# Patient Record
Sex: FEMALE | Race: WHITE | NOT HISPANIC OR LATINO | URBAN - METROPOLITAN AREA
[De-identification: names, ages, dates, MRNs, and addresses within clinical notes are randomized per-mention and may not be internally consistent; named-entity substitution may affect disease eponyms.]

---

## 2019-08-23 ENCOUNTER — EMERGENCY (EMERGENCY)
Facility: HOSPITAL | Age: 44
LOS: 1 days | Discharge: HOME | End: 2019-08-23
Attending: EMERGENCY MEDICINE
Payer: COMMERCIAL

## 2019-08-23 VITALS
TEMPERATURE: 98 F | RESPIRATION RATE: 18 BRPM | WEIGHT: 139.99 LBS | HEART RATE: 77 BPM | DIASTOLIC BLOOD PRESSURE: 80 MMHG | HEIGHT: 68 IN | SYSTOLIC BLOOD PRESSURE: 120 MMHG | OXYGEN SATURATION: 100 %

## 2019-08-23 PROCEDURE — 99283 EMERGENCY DEPT VISIT LOW MDM: CPT | Mod: 25

## 2019-08-23 PROCEDURE — 73660 X-RAY EXAM OF TOE(S): CPT | Mod: 26,LT

## 2019-08-23 PROCEDURE — 28490 TREAT BIG TOE FRACTURE: CPT | Mod: TA

## 2019-08-23 PROCEDURE — 28490 TREAT BIG TOE FRACTURE: CPT | Mod: 54

## 2019-08-23 PROCEDURE — 90471 IMMUNIZATION ADMIN: CPT

## 2019-08-23 PROCEDURE — 73660 X-RAY EXAM OF TOE(S): CPT

## 2019-08-23 PROCEDURE — 90715 TDAP VACCINE 7 YRS/> IM: CPT

## 2019-08-23 RX ORDER — TETANUS TOXOID, REDUCED DIPHTHERIA TOXOID AND ACELLULAR PERTUSSIS VACCINE, ADSORBED 5; 2.5; 8; 8; 2.5 [IU]/.5ML; [IU]/.5ML; UG/.5ML; UG/.5ML; UG/.5ML
0.5 SUSPENSION INTRAMUSCULAR ONCE
Refills: 0 | Status: COMPLETED | OUTPATIENT
Start: 2019-08-23 | End: 2019-08-23

## 2019-08-23 RX ORDER — IBUPROFEN 200 MG
600 TABLET ORAL ONCE
Refills: 0 | Status: COMPLETED | OUTPATIENT
Start: 2019-08-23 | End: 2019-08-23

## 2019-08-23 RX ADMIN — TETANUS TOXOID, REDUCED DIPHTHERIA TOXOID AND ACELLULAR PERTUSSIS VACCINE, ADSORBED 0.5 MILLILITER(S): 5; 2.5; 8; 8; 2.5 SUSPENSION INTRAMUSCULAR at 10:50

## 2019-08-23 RX ADMIN — Medication 600 MILLIGRAM(S): at 11:25

## 2019-08-23 RX ADMIN — Medication 600 MILLIGRAM(S): at 10:51

## 2019-08-23 NOTE — ED PROVIDER NOTE - NSFOLLOWUPINSTRUCTIONS_ED_ALL_ED_FT
FOLLOW-UP WITH THE [facility]  [phone].  TAKE MOTRIN (IBUPROFEN) AS NEEDED FOR PAIN, 600 MG EVERY 8 HOURS FOR THE NEXT 3 DAYS.  RETURN TO THE ED FOR ANY NEW OR WORSENING SYMPTOMS.    AMBULATORY CARE:    A toe fracture is a break in 1 or more of the bones in your toe. It is most commonly caused by a direct blow to the toe. The bones in your toe may just be broken, or they may be out of place or . Foot Anatomy         Common symptoms include the following:     Pain, redness, and swelling      Inability to bend or move your toe      Inability to walk or put weight on your toe      Toe is bent at an abnormal angle    Seek care immediately if:     Blood soaks through your bandage.      You have severe pain in your toe.      Your toe is cold or numb.    Contact your healthcare provider if:     You have a fever.      Your pain does not go away, even after treatment.      Your toe continues to hurt even after it has healed.      You have questions or concerns about your condition or care.    Treatment for a toe fracture may include any of the following:     NSAIDs, such as ibuprofen, help decrease swelling, pain, and fever. This medicine is available with or without a doctor's order. NSAIDs can cause stomach bleeding or kidney problems in certain people. If you take blood thinner medicine, always ask your healthcare provider if NSAIDs are safe for you. Always read the medicine label and follow directions.      Prescription pain medicine may be given. Ask your healthcare provider how to take this medicine safely. Some prescription pain medicines contain acetaminophen. Do not take other medicines that contain acetaminophen without talking to your healthcare provider. Too much acetaminophen may cause liver damage. Prescription pain medicine may cause constipation. Ask your healthcare provider how to prevent or treat constipation.       Antibiotics treat a bacterial infection. You may need antibiotics if you have an open wound.      Take your medicine as directed. Contact your healthcare provider if you think your medicine is not helping or if you have side effects. Tell him of her if you are allergic to any medicine. Keep a list of the medicines, vitamins, and herbs you take. Include the amounts, and when and why you take them. Bring the list or the pill bottles to follow-up visits. Carry your medicine list with you in case of an emergency.    Self-care:     Use buddy tape, an elastic bandage, or a splint as directed. These help keep your toe in its correct position as it heals. Buddy tape is when your fractured toe and the toe next to it are taped together.       Use support devices including a cane, crutches, walking boot, or hard soled shoe as directed. These help protect your broken toe and limit movement so it can heal.Walking Boot           Rest your toe so that it can heal. Return to normal activities as directed.      Apply ice on your toe for 15 to 20 minutes every hour or as directed. Use an ice pack, or put crushed ice in a plastic bag. Cover it with a towel. Ice helps prevent tissue damage and decreases swelling and pain.      Elevate your toe above the level of your heart as often as you can. This will help decrease swelling and pain. Prop your toe on pillows or blankets to keep it elevated comfortably.     Follow up with your healthcare provider as directed: You may need to return in 2 to 4 weeks. Write down your questions so you remember to ask them during your visits.

## 2019-08-23 NOTE — ED PROVIDER NOTE - CARE PLAN
Principal Discharge DX:	Closed nondisplaced fracture of distal phalanx of left great toe, initial encounter

## 2019-08-23 NOTE — ED PROVIDER NOTE - PHYSICAL EXAMINATION
GEN: NAD  HEENT: NCAT  MSK: L great toe mild edema/bruising, TTP, limited ROM; normal ROM at ankle, normal ROM @ knee.  ~1.5cm2 abrasion to the anterior L shin, much smaller abrasion to anterior R shin, no repair necessary GEN: NAD  HEENT: NCAT  MSK: L great toe mild edema/bruising, TTP over the phalyngeal joint, limited ROM of the hallux 2/2 pain; normal ROM at ankle, normal ROM @ knee.  no midfoot pain/tenderness  ~1.5cm2 abrasion to the anterior L shin, much smaller abrasion to anterior R shin, no repair necessary GEN: NAD  HEENT: NCAT  MSK: L great toe mild edema/bruising, TTP over the phalyngeal joint, limited ROM of the hallux 2/2 pain though normal ROM at the MTP joint; normal ROM at ankle, normal ROM @ knee.  no midfoot pain/tenderness/edema  ~1.5cm2 abrasion to the anterior L shin, much smaller abrasion to anterior R shin, no repair necessary

## 2019-08-23 NOTE — ED PROVIDER NOTE - OBJECTIVE STATEMENT
43 yo female mechanical slip/fall last PM after tripping on an umbrella stand.  did not hit head, no LOC.  c/o L great toe pain.  also with abrasions to L>R shin, put bactin on them this AM.

## 2019-08-23 NOTE — ED ADULT NURSE NOTE - NSIMPLEMENTINTERV_GEN_ALL_ED
Implemented All Universal Safety Interventions:  [place] to call system. Call bell, personal items and telephone within reach. Instruct patient to call for assistance. Room bathroom lighting operational. Non-slip footwear when patient is off stretcher. Physically safe environment: no spills, clutter or unnecessary equipment. Stretcher in lowest position, wheels locked, appropriate side rails in place.

## 2019-08-23 NOTE — ED PROVIDER NOTE - CLINICAL SUMMARY MEDICAL DECISION MAKING FREE TEXT BOX
tetanus, medicate, x-ray r/o fx tetanus, medicate, x-ray r/o fx big toe fx.  no ankle injury, no lisfranc injury.

## 2024-03-18 ENCOUNTER — EMERGENCY (EMERGENCY)
Facility: HOSPITAL | Age: 49
LOS: 1 days | Discharge: HOME | End: 2024-03-18
Attending: EMERGENCY MEDICINE
Payer: COMMERCIAL

## 2024-03-18 VITALS
HEART RATE: 66 BPM | HEIGHT: 68 IN | OXYGEN SATURATION: 99 % | DIASTOLIC BLOOD PRESSURE: 87 MMHG | WEIGHT: 156.97 LBS | TEMPERATURE: 98 F | RESPIRATION RATE: 16 BRPM | SYSTOLIC BLOOD PRESSURE: 126 MMHG

## 2024-03-18 VITALS
SYSTOLIC BLOOD PRESSURE: 120 MMHG | RESPIRATION RATE: 18 BRPM | OXYGEN SATURATION: 100 % | HEART RATE: 59 BPM | DIASTOLIC BLOOD PRESSURE: 73 MMHG | TEMPERATURE: 98 F

## 2024-03-18 LAB
FLUAV AG NPH QL: SIGNIFICANT CHANGE UP
FLUBV AG NPH QL: SIGNIFICANT CHANGE UP
RSV RNA NPH QL NAA+NON-PROBE: SIGNIFICANT CHANGE UP
SARS-COV-2 RNA SPEC QL NAA+PROBE: SIGNIFICANT CHANGE UP

## 2024-03-18 PROCEDURE — 99284 EMERGENCY DEPT VISIT MOD MDM: CPT

## 2024-03-18 PROCEDURE — 87637 SARSCOV2&INF A&B&RSV AMP PRB: CPT

## 2024-03-18 RX ORDER — FEXOFENADINE HCL 30 MG
1 TABLET ORAL
Qty: 30 | Refills: 0
Start: 2024-03-18 | End: 2024-04-16

## 2024-03-18 NOTE — CONSULT NOTE ADULT - ASSESSMENT
NOTE INCOMPLETE    ASSESSMENT/PLAN:   #     - Start  - Recommend     Thank you for this consult. Case seen and reviewed with attending dermatologist Dr. Kobl    Please page [phone] with a 10-digit call-back number for further related questions.    Clary Prescott MD  Resident Physician, PGY-2   [facility]   Pager: [phone] ASSESSMENT/PLAN:     # PLEVA (Pityriasis lichenoides et varioliformis acuta) vs. papular urticaria vs. viral exanthem  - Start clobetasol 0.05% ointment BID to affected/itchy or symptomatic areas on body for up to 2 weeks on, at least 1 week break before resuming as needed. Avoid face and groin.  - If mildly symptomatic, triamcinolone 0.01% ointment (less potent than clobetasol) BID PRN for up to 2 weeks on, 1 week off.   - Recommend daily Allegra 180mg/day  - Follow up RVP performed today 3/18  - Recommend outpatient follow up with Dr. Kolb within 1 week for re-evaluation and possible skin biopsy. Dermatology will coordinate. Appointment will be at the address below:  [address] [place] [address], [facility] [address]  [place] [address]  [phone]    Thank you for this consult. Case seen and reviewed with attending dermatologist Dr. Kolb    Please page [phone] with a 10-digit call-back number for further related questions.    Clary Prescott MD  Resident Physician, PGY-2   [facility]   Pager: [phone]

## 2024-03-18 NOTE — ED PROVIDER NOTE - PHYSICAL EXAMINATION
General: Well-appearing, NAD  Head: Atraumatic, normocephalic  Eyes: EOM grossly in tact, no scleral icterus  ENT: moist mucous membranes  Neurology: A&Ox 3 , nonfocal, GUPTA x 4  Respiratory: normal respiratory effort, no wheezing, CTAB  CV: Extremities warm and well perfused, RRR  Abdominal: Soft, non-distended  Extremities: No edema  Skin: blanching maculopapular rash on trunk, extremities, chest and back

## 2024-03-18 NOTE — ED PROVIDER NOTE - OBJECTIVE STATEMENT
49 yo F w/ no significant pmh presenting w/ a rash x 6d. She denies any recent changes in products in her home, sick contacts. She went to [place] 3 weeks ago but did not swim. No one in her home or work has a similar rash. No fever, dysuria, trouble urinating, cough, sore throat, trouble breathing. She does not have any lesions on her palms or soles. She saw a dermatologist who gave her triamcinolone, she does not think it has helped. The only new thing she has recently been exposed to was 1 dose of aspirin.

## 2024-03-18 NOTE — ED PROVIDER NOTE - NSFOLLOWUPCLINICS_GEN_ALL_ED_FT
[facility]  Dermatology  [address], [place] [address]  Phone: [phone]  Fax: [phone]    [facility]  Dermatology  [address], [place] [id number]  Phone: [phone]  Fax:

## 2024-03-18 NOTE — ED PROVIDER NOTE - NSFOLLOWUPINSTRUCTIONS_ED_ALL_ED_FT
-  Please follow up with your primary care physician or dermatologist within 2-3 days.     -  You may take 975 mg acetaminophen every 6 hours as needed for pain.    -  You were seen in the ER for a rash. Based on the rash we want to do a trial of oral steroids and antihistamines. We sent these medications to your pharmacy, take them as prescribed. If your rash does not improve, please see your dermatologist as you may require a biopsy.    -  Call your doctor or seek immediate medical care if:    You have signs of infection, such as:  Increased pain, swelling, warmth, or redness.  Red streaks leading from the area.  Pus draining from the area.  A fever.  You have joint pain along with the rash. -  Please follow up with your primary care physician or dermatologist within 1 week.    -  You may take 975 mg acetaminophen every 6 hours as needed for pain.    -  You were seen in the ER for a rash. Based on the rash dermatology recommends clobetasol ointment for itchy spots and triamcinolone to the rash 2 times a day. They recommended allegra daily. We sent these medications to your pharmacy, take them as prescribed. Please see your dermatologist as you may require a biopsy.    -  Call your doctor or seek immediate medical care if:    You have signs of infection, such as:  Increased pain, swelling, warmth, or redness.  Red streaks leading from the area.  Pus draining from the area.  A fever.  You have joint pain along with the rash.

## 2024-03-18 NOTE — ED PROVIDER NOTE - CLINICAL SUMMARY MEDICAL DECISION MAKING FREE TEXT BOX
49 yo F w/ no significant pmh presenting w/ a rash x 6d. Differential diagnosis includes but is not limited to urticaria, drug eruption, viral infection, contact dermatitis, allergy. Pt took ASA which could be the issue. would  not to take any more aspirin. would encourage dermatology f/u and allergy testing. no testing indicated in the ER. pt has already seen dermatology. would give po prednisone and zyrtec. if no improvement the pt may need a biopsy outpatient.

## 2024-03-18 NOTE — ED PROVIDER NOTE - ATTENDING CONTRIBUTION TO CARE
Attending MD Diaz: I personally have seen and examined this patient.  Resident note reviewed and agree on plan of care and except where noted.  See below for details.     Seen in [facility]    48F with no reported contributory PMH/PSH/Meds, no known drug allergies presents to the ED with rash for about a week.  Reports three weeks ago went to [place], denies swimming.  Reports used new body wash about 2 weeks ago and about a week later developed rash.  Reports went to see dermatology (showed name of Nancy Brumfield PA-C) and was given Rx for Triamcinolone.  Reports not improving.  Reports rash is not pruritic, denies drainage from rash.  Denies associated fever, chills, urinary symptoms, URI symptoms.  Denies nausea, vomiting, diarrhea, abdominal pain, chest pain, shortness of breath.  Denies oral lesions or noting rash on hands and palms.    Exam:   General: NAD  HENT: head NCAT, airway patent   Chest: symmetric chest rise, no increased work of breathing  MSK: ranging neck freely  Neuro: moving all extremities spontaneously, sensory grossly intact, no gross neuro deficits  Psych: normal mood and affect   Skin: scattered rash on chest > abdomen > back, worse at bilateral axillae and extending from axillae, bilateral proximal inner thighs, scattered elsewhere on LEs, +bilateral UEs dorsal/ventral, 2-4mm in size, round, nontender, nonvesicular    TO BE COMPLETED Attending MD Diaz: I personally have seen and examined this patient.  Resident note reviewed and agree on plan of care and except where noted.  See below for details.     Seen in [facility]    48F with no reported contributory PMH/PSH/Meds, no known drug allergies presents to the ED with rash for about a week.  Reports three weeks ago went to [place], denies swimming.  Reports used new body wash about 2 weeks ago and about a week later developed rash.  Reports went to see dermatology (showed name of Nancy Brumfield PA-C) and was given Rx for Triamcinolone.  Reports not improving.  Reports rash is not pruritic, denies drainage from rash.  Denies associated fever, chills, urinary symptoms, URI symptoms.  Denies nausea, vomiting, diarrhea, abdominal pain, chest pain, shortness of breath.  Denies oral lesions or noting rash on hands and palms.    Exam:   General: NAD  HENT: head NCAT, airway patent   Chest: symmetric chest rise, no increased work of breathing  MSK: ranging neck freely  Neuro: moving all extremities spontaneously, sensory grossly intact, no gross neuro deficits  Psych: normal mood and affect   Skin: scattered rash on chest > abdomen > back, worse at bilateral axillae and extending from axillae, bilateral proximal inner thighs, scattered elsewhere on LEs, +bilateral UEs dorsal/ventral, 2-4mm in size, round, nontender, nonvesicular    A/P: 48F with rash, worsening, DDx includes viral exanthem, contact dermatitis, ?insect bites (less likely given location and distribution), will consult derm

## 2024-03-18 NOTE — ED PROVIDER NOTE - PATIENT PORTAL LINK FT
You can access the FollowMyHealth Patient Portal offered by [facility] by registering at the following website: [email or web address]. By joining [facility]’s FollowMyHealth portal, you will also be able to view your health information using other applications (apps) compatible with our system.

## 2024-03-18 NOTE — ED ADULT NURSE NOTE - OBJECTIVE STATEMENT
49 yo F w/ no significant pmh presenting w/ a rash x 6d. She denies any recent changes in products in her home, sick contacts. She went to [place] 3 weeks ago but did not swim. No one in her home or work has a similar rash. No fever, dysuria, trouble urinating, cough, sore throat, trouble breathing. She does not have any lesions on her palms or soles. She saw a dermatologist who gave her triamcinolone, she does not think it has helped. Pt was seen by derm Pt has appointment on Tuesday for follow up no orders at this time Leonardo

## 2024-03-18 NOTE — ED PROVIDER NOTE - PROGRESS NOTE DETAILS
Jarvis, PGY-3, EM: Pt seen by dermatology. They recommended clobetasol ointment, triamcinolone ointment and allegra daily. pt will f/u in the office for a biopsy. requesting RVP but I am unable to order.

## 2024-03-18 NOTE — ED PROVIDER NOTE - ED STEMI HIDDEN
Retention Suture Text: Retention sutures were placed to support the closure and prevent dehiscence. hide

## 2024-03-19 NOTE — ED POST DISCHARGE NOTE - ADDITIONAL DOCUMENTATION
I was emailed by referral coordinator, pt requesting call regarding viral swab, I called and informed pt of results. -Evan Loomis PA-C

## 2024-03-26 ENCOUNTER — LABORATORY RESULT (OUTPATIENT)
Age: 49
End: 2024-03-26

## 2024-03-26 ENCOUNTER — APPOINTMENT (OUTPATIENT)
Dept: DERMATOLOGY | Facility: CLINIC | Age: 49
End: 2024-03-26
Payer: COMMERCIAL

## 2024-03-26 DIAGNOSIS — D48.5 NEOPLASM OF UNCERTAIN BEHAVIOR OF SKIN: ICD-10-CM

## 2024-03-26 PROCEDURE — 99204 OFFICE O/P NEW MOD 45 MIN: CPT | Mod: 25

## 2024-03-26 PROCEDURE — 11104 PUNCH BX SKIN SINGLE LESION: CPT

## 2024-03-26 PROCEDURE — 11105 PUNCH BX SKIN EA SEP/ADDL: CPT

## 2024-04-03 ENCOUNTER — APPOINTMENT (OUTPATIENT)
Dept: DERMATOLOGY | Facility: CLINIC | Age: 49
End: 2024-04-03
Payer: COMMERCIAL

## 2024-04-03 DIAGNOSIS — L30.9 DERMATITIS, UNSPECIFIED: ICD-10-CM

## 2024-04-03 PROCEDURE — 99214 OFFICE O/P EST MOD 30 MIN: CPT

## 2024-04-03 RX ORDER — PERMETHRIN 50 MG/G
5 CREAM TOPICAL
Qty: 8 | Refills: 0 | Status: ACTIVE | COMMUNITY
Start: 2024-04-03 | End: 1900-01-01

## 2024-04-03 NOTE — ASSESSMENT
[FreeTextEntry1] : #dermatitis, not improving w/ triamcinolone #neoplasm of uncertain behavior of skin  Prelim bx shows numerous eos, possiblle arthropoc, ?nodular scabies, ddx also includes  LyP vs b-cell lympoproliferative disorder vs papular urticaria v Tissue culture neg so far, final fungal and mycobacterial culture still pending START ivermectin 15mg today and repeat in 1 week. SED START clobetasol ointment BID to affected area only, no more than 2 weeks, avoid face and groin - r/b/a topical steroids were discussed including but not limited to thinning of the skin, atrophy and dyspigmentation. START permethrin neck down today, repeat in 1 week

## 2024-04-03 NOTE — HISTORY OF PRESENT ILLNESS
[de-identified] : CHER OROZCO is a 48 year old female with no significant PMH, who presents for f/u after being seen by dermatology in the [facility] ED for a rash x 2 weeks.  Biopsy from last week still pending, while some lesions are drying out she is getting some new ones that are painful, prelim bx report shows numerous eos  HISTORY Started as pink bumps on upper abdomen. Mildly itchy, no new products or contacts to that area, no recollection of bug bites or other trauma to this area. No one else at home with similar rash. The rash progressed to lower abdomen, arms, upper legs, and now also with spots on forehead/cheeks.  Saw outside derm who prescribed Triamcinolone cream - not helping much.  Traveled to [place] recently, 2/26-2/29 for business trip; no other recent travel. No recent illnesses. Reportedly had routine labs done with PCP recenlty with no abnormalities noted. No fever/chills, weight loss. Unable to obtain clobetasol ointment due to insurance not allowing additional rx within timeframe  From [place] originally, goes back every summer Works in housekeeping at [employer]  [FreeTextEntry1] : Hospital f/u

## 2024-04-03 NOTE — PHYSICAL EXAM
[FreeTextEntry3] : scattered red edematous papules of varying sizes on the arms (especiailly medial forearms, and inner thigh, face brown- pink macules and papuels, some which collarettes of scale on the trunk and arms

## 2024-04-04 RX ORDER — HALOBETASOL PROPIONATE 0.5 MG/G
0.05 OINTMENT TOPICAL
Qty: 1 | Refills: 3 | Status: ACTIVE | COMMUNITY
Start: 2024-04-04 | End: 1900-01-01

## 2024-04-05 RX ORDER — CLOBETASOL PROPIONATE 0.5 MG/G
0.05 OINTMENT TOPICAL
Qty: 1 | Refills: 2 | Status: ACTIVE | COMMUNITY
Start: 2024-04-03

## 2024-04-08 RX ORDER — IVERMECTIN 3 MG/1
3 TABLET ORAL
Qty: 10 | Refills: 0 | Status: ACTIVE | COMMUNITY
Start: 2024-04-03

## 2024-04-11 ENCOUNTER — NON-APPOINTMENT (OUTPATIENT)
Age: 49
End: 2024-04-11

## 2024-04-12 ENCOUNTER — NON-APPOINTMENT (OUTPATIENT)
Age: 49
End: 2024-04-12

## 2024-04-12 DIAGNOSIS — Z79.899 OTHER LONG TERM (CURRENT) DRUG THERAPY: ICD-10-CM

## 2024-04-16 ENCOUNTER — APPOINTMENT (OUTPATIENT)
Dept: DERMATOLOGY | Facility: CLINIC | Age: 49
End: 2024-04-16
Payer: COMMERCIAL

## 2024-04-16 DIAGNOSIS — C86.6 PRIMARY CUTANEOUS CD30-POSITIVE T-CELL PROLIFERATIONS: ICD-10-CM

## 2024-04-16 LAB
ALBUMIN SERPL ELPH-MCNC: 4.8 G/DL
ALP BLD-CCNC: 59 U/L
ALT SERPL-CCNC: 10 U/L
ANION GAP SERPL CALC-SCNC: 12 MMOL/L
AST SERPL-CCNC: 17 U/L
BASOPHILS # BLD AUTO: 0.04 K/UL
BASOPHILS NFR BLD AUTO: 0.9 %
BILIRUB SERPL-MCNC: 0.5 MG/DL
BUN SERPL-MCNC: 16 MG/DL
CALCIUM SERPL-MCNC: 9.5 MG/DL
CHLORIDE SERPL-SCNC: 105 MMOL/L
CO2 SERPL-SCNC: 25 MMOL/L
CREAT SERPL-MCNC: 0.71 MG/DL
EGFR: 105 ML/MIN/1.73M2
EOSINOPHIL # BLD AUTO: 0.02 K/UL
EOSINOPHIL NFR BLD AUTO: 0.4 %
GLUCOSE SERPL-MCNC: 89 MG/DL
HBV E AB SER QL: NONREACTIVE
HBV E AG SER QL: NONREACTIVE
HCT VFR BLD CALC: 42.6 %
HGB BLD-MCNC: 13.8 G/DL
IMM GRANULOCYTES NFR BLD AUTO: 0.2 %
LYMPHOCYTES # BLD AUTO: 1.84 K/UL
LYMPHOCYTES NFR BLD AUTO: 39.6 %
MAN DIFF?: NORMAL
MCHC RBC-ENTMCNC: 31.2 PG
MCHC RBC-ENTMCNC: 32.4 GM/DL
MCV RBC AUTO: 96.2 FL
MONOCYTES # BLD AUTO: 0.25 K/UL
MONOCYTES NFR BLD AUTO: 5.4 %
NEUTROPHILS # BLD AUTO: 2.49 K/UL
NEUTROPHILS NFR BLD AUTO: 53.5 %
PLATELET # BLD AUTO: 216 K/UL
POTASSIUM SERPL-SCNC: 4.2 MMOL/L
PROT SERPL-MCNC: 7.1 G/DL
RBC # BLD: 4.43 M/UL
RBC # FLD: 12.2 %
SODIUM SERPL-SCNC: 142 MMOL/L
WBC # FLD AUTO: 4.65 K/UL

## 2024-04-16 PROCEDURE — 99214 OFFICE O/P EST MOD 30 MIN: CPT

## 2024-04-19 ENCOUNTER — TRANSCRIPTION ENCOUNTER (OUTPATIENT)
Age: 49
End: 2024-04-19

## 2024-04-19 LAB
HBV CORE IGG+IGM SER QL: NONREACTIVE
HBV CORE IGM SER QL: NONREACTIVE
HBV SURFACE AB SER QL: NONREACTIVE
HBV SURFACE AG SER QL: NONREACTIVE
HCV AB SER QL: NONREACTIVE
HCV S/CO RATIO: 0.07 S/CO
M TB IFN-G BLD-IMP: NEGATIVE
QUANTIFERON TB PLUS MITOGEN MINUS NIL: >10 IU/ML
QUANTIFERON TB PLUS NIL: 0.02 IU/ML
QUANTIFERON TB PLUS TB1 MINUS NIL: 0.17 IU/ML
QUANTIFERON TB PLUS TB2 MINUS NIL: 0.12 IU/ML

## 2024-04-23 NOTE — HISTORY OF PRESENT ILLNESS
[FreeTextEntry1] : f/u [de-identified] : CHER OROZCO is a 48 year old female with no significant PMH, who presents for f/u :  1. rash x about 1 month - improving wiht clobetasol, no new spots that she knows of.  Biopsy with folliculocentric and perivascular mixed infiltrate comprised of neuts, lymphs, eos, and enlarged atypical CD30+ cells, most consistent with LyP  cultures neg  HISTORY Started as pink bumps on upper abdomen. Mildly itchy, no new products or contacts to that area, no recollection of bug bites or other trauma to this area. No one else at home with similar rash. The rash progressed to lower abdomen, arms, upper legs, and now also with spots on forehead/cheeks.  Saw outside derm who prescribed Triamcinolone cream - not helping much.  Traveled to [place] recently, 2/26-2/29 for business trip; no other recent travel. No recent illnesses. Reportedly had routine labs done with PCP recenlty with no abnormalities noted. No fever/chills, weight loss. 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

## 2024-04-23 NOTE — ASSESSMENT
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 does not have quantiferon/hepatitis testing - check quantiferon and hepatitis just in case decides to start mtx at future date  #high risk med mgmt - qg and hep serologies today We have discussed possible adverse effects of this medication, including but not limited to fatigue, GI upset, hair loss, oral ulcers, low blood counts, inflammation of the liver, fibrosis of the liver or lungs, infections, cancers and hypersensitivity reaction. We have discussed that alcohol consumption must be eliminated while on this medication. We have discussed that pregnancy is non-viable while on this medication and 3mo after completing course, and that patient must take adequate measures to eliminate risk of conceiving or participation in conceiving while on this medication. Patient expressed understanding of these risks.

## 2024-05-12 LAB — DERMATOLOGY BIOPSY: NORMAL

## 2024-06-11 ENCOUNTER — APPOINTMENT (OUTPATIENT)
Dept: DERMATOLOGY | Facility: CLINIC | Age: 49
End: 2024-06-11
Payer: COMMERCIAL

## 2024-06-11 PROCEDURE — 99214 OFFICE O/P EST MOD 30 MIN: CPT

## 2024-06-11 NOTE — ASSESSMENT
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 Explained benign nature, although possible increased risk for developing other lymphoproliferative dx over her lifetime. Encourages her to pursue routine skin exams and f/u hematology as needed - discussed that given there is a significant degree of clinical improvement, ok to hold off on methotrexate; can continue with clobetasol ointment BID PRN spots  - can f/u with Dr. Agrawal

## 2024-06-11 NOTE — HISTORY OF PRESENT ILLNESS
[FreeTextEntry1] : f/u [de-identified] : CHER OROZCO is a 48 year old female with no significant PMH, who presents for f/u :  # bx proven follicular LyP - no new lesions only PIH, feels well, saw 2 hematologists who performed extensive bloodwork so far negative Biopsy with folliculocentric and perivascular mixed infiltrate comprised of neuts, lymphs, eos, and enlarged atypical CD30+ cells, most consistent with LyP  cultures neg  HISTORY Started as pink bumps on upper abdomen. Mildly itchy, no new products or contacts to that area, no recollection of bug bites or other trauma to this area. No one else at home with similar rash. The rash progressed to lower abdomen, arms, upper legs, and now also with spots on forehead/cheeks.  Saw outside derm who prescribed Triamcinolone cream - not helping much.  Traveled to [place] recently, 2/26-2/29 for business trip; no other recent travel. No recent illnesses. Reportedly had routine labs done with PCP recenlty with no abnormalities noted. No fever/chills, weight loss. 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

## 2024-06-11 NOTE — PHYSICAL EXAM
[FreeTextEntry3] : hyperpigmented macules  no LAD Otc Regimen: Apply petroleum to the affected areas prn Detail Level: Zone

## 2024-06-12 ENCOUNTER — EMERGENCY (EMERGENCY)
Facility: HOSPITAL | Age: 49
LOS: 1 days | Discharge: HOME | End: 2024-06-12
Attending: EMERGENCY MEDICINE
Payer: COMMERCIAL

## 2024-06-12 VITALS
TEMPERATURE: 97 F | OXYGEN SATURATION: 99 % | HEIGHT: 68 IN | WEIGHT: 158.07 LBS | DIASTOLIC BLOOD PRESSURE: 74 MMHG | SYSTOLIC BLOOD PRESSURE: 123 MMHG | HEART RATE: 70 BPM | RESPIRATION RATE: 16 BRPM

## 2024-06-12 LAB
ALBUMIN SERPL ELPH-MCNC: 4.2 G/DL — SIGNIFICANT CHANGE UP (ref 3.3–5)
ALP SERPL-CCNC: 52 U/L — SIGNIFICANT CHANGE UP (ref 40–120)
ALT FLD-CCNC: 12 U/L — SIGNIFICANT CHANGE UP (ref 10–45)
ANION GAP SERPL CALC-SCNC: 14 MMOL/L — SIGNIFICANT CHANGE UP (ref 5–17)
AST SERPL-CCNC: 20 U/L — SIGNIFICANT CHANGE UP (ref 10–40)
BASOPHILS # BLD AUTO: 0.03 K/UL — SIGNIFICANT CHANGE UP (ref 0–0.2)
BASOPHILS NFR BLD AUTO: 0.5 % — SIGNIFICANT CHANGE UP (ref 0–2)
BILIRUB SERPL-MCNC: 0.3 MG/DL — SIGNIFICANT CHANGE UP (ref 0.2–1.2)
BUN SERPL-MCNC: 15 MG/DL — SIGNIFICANT CHANGE UP (ref 7–23)
CALCIUM SERPL-MCNC: 9.6 MG/DL — SIGNIFICANT CHANGE UP (ref 8.4–10.5)
CHLORIDE SERPL-SCNC: 106 MMOL/L — SIGNIFICANT CHANGE UP (ref 96–108)
CO2 SERPL-SCNC: 21 MMOL/L — LOW (ref 22–31)
CREAT SERPL-MCNC: 0.55 MG/DL — SIGNIFICANT CHANGE UP (ref 0.5–1.3)
EGFR: 114 ML/MIN/1.73M2 — SIGNIFICANT CHANGE UP
EOSINOPHIL # BLD AUTO: 0.07 K/UL — SIGNIFICANT CHANGE UP (ref 0–0.5)
EOSINOPHIL NFR BLD AUTO: 1.1 % — SIGNIFICANT CHANGE UP (ref 0–6)
GLUCOSE SERPL-MCNC: 75 MG/DL — SIGNIFICANT CHANGE UP (ref 70–99)
HCT VFR BLD CALC: 40.3 % — SIGNIFICANT CHANGE UP (ref 34.5–45)
HGB BLD-MCNC: 13.2 G/DL — SIGNIFICANT CHANGE UP (ref 11.5–15.5)
IMM GRANULOCYTES NFR BLD AUTO: 0.3 % — SIGNIFICANT CHANGE UP (ref 0–0.9)
LIDOCAIN IGE QN: 35 U/L — SIGNIFICANT CHANGE UP (ref 7–60)
LYMPHOCYTES # BLD AUTO: 2.33 K/UL — SIGNIFICANT CHANGE UP (ref 1–3.3)
LYMPHOCYTES # BLD AUTO: 35.4 % — SIGNIFICANT CHANGE UP (ref 13–44)
MCHC RBC-ENTMCNC: 31.9 PG — SIGNIFICANT CHANGE UP (ref 27–34)
MCHC RBC-ENTMCNC: 32.8 GM/DL — SIGNIFICANT CHANGE UP (ref 32–36)
MCV RBC AUTO: 97.3 FL — SIGNIFICANT CHANGE UP (ref 80–100)
MONOCYTES # BLD AUTO: 0.49 K/UL — SIGNIFICANT CHANGE UP (ref 0–0.9)
MONOCYTES NFR BLD AUTO: 7.4 % — SIGNIFICANT CHANGE UP (ref 2–14)
NEUTROPHILS # BLD AUTO: 3.64 K/UL — SIGNIFICANT CHANGE UP (ref 1.8–7.4)
NEUTROPHILS NFR BLD AUTO: 55.3 % — SIGNIFICANT CHANGE UP (ref 43–77)
NRBC # BLD: 0 /100 WBCS — SIGNIFICANT CHANGE UP (ref 0–0)
PLATELET # BLD AUTO: 207 K/UL — SIGNIFICANT CHANGE UP (ref 150–400)
POTASSIUM SERPL-MCNC: 4.4 MMOL/L — SIGNIFICANT CHANGE UP (ref 3.5–5.3)
POTASSIUM SERPL-SCNC: 4.4 MMOL/L — SIGNIFICANT CHANGE UP (ref 3.5–5.3)
PROT SERPL-MCNC: 7 G/DL — SIGNIFICANT CHANGE UP (ref 6–8.3)
RBC # BLD: 4.14 M/UL — SIGNIFICANT CHANGE UP (ref 3.8–5.2)
RBC # FLD: 12.8 % — SIGNIFICANT CHANGE UP (ref 10.3–14.5)
SODIUM SERPL-SCNC: 141 MMOL/L — SIGNIFICANT CHANGE UP (ref 135–145)
WBC # BLD: 6.58 K/UL — SIGNIFICANT CHANGE UP (ref 3.8–10.5)
WBC # FLD AUTO: 6.58 K/UL — SIGNIFICANT CHANGE UP (ref 3.8–10.5)

## 2024-06-12 PROCEDURE — 80053 COMPREHEN METABOLIC PANEL: CPT

## 2024-06-12 PROCEDURE — 96374 THER/PROPH/DIAG INJ IV PUSH: CPT

## 2024-06-12 PROCEDURE — 36415 COLL VENOUS BLD VENIPUNCTURE: CPT

## 2024-06-12 PROCEDURE — 93005 ELECTROCARDIOGRAM TRACING: CPT

## 2024-06-12 PROCEDURE — 96375 TX/PRO/DX INJ NEW DRUG ADDON: CPT

## 2024-06-12 PROCEDURE — 85025 COMPLETE CBC W/AUTO DIFF WBC: CPT

## 2024-06-12 PROCEDURE — 83690 ASSAY OF LIPASE: CPT

## 2024-06-12 PROCEDURE — 99285 EMERGENCY DEPT VISIT HI MDM: CPT | Mod: 25

## 2024-06-12 PROCEDURE — 76700 US EXAM ABDOM COMPLETE: CPT | Mod: 26

## 2024-06-12 PROCEDURE — 99285 EMERGENCY DEPT VISIT HI MDM: CPT

## 2024-06-12 PROCEDURE — 76700 US EXAM ABDOM COMPLETE: CPT

## 2024-06-12 RX ORDER — KETOROLAC TROMETHAMINE 30 MG/ML
15 SYRINGE (ML) INJECTION ONCE
Refills: 0 | Status: DISCONTINUED | OUTPATIENT
Start: 2024-06-12 | End: 2024-06-12

## 2024-06-12 RX ORDER — FAMOTIDINE 10 MG/ML
20 INJECTION INTRAVENOUS ONCE
Refills: 0 | Status: COMPLETED | OUTPATIENT
Start: 2024-06-12 | End: 2024-06-12

## 2024-06-12 RX ADMIN — Medication 15 MILLIGRAM(S): at 22:04

## 2024-06-12 RX ADMIN — FAMOTIDINE 20 MILLIGRAM(S): 10 INJECTION INTRAVENOUS at 22:04

## 2024-06-12 NOTE — ED ADULT NURSE REASSESSMENT NOTE - NS ED NURSE REASSESS COMMENT FT1
MD Ferguson spoke with patient. Patient agreeing to IV push medications and as per MD Ferguson remove PIV.

## 2024-06-12 NOTE — ED PROVIDER NOTE - PATIENT PORTAL LINK FT
You can access the FollowMyHealth Patient Portal offered by [facility] by registering at the following website: [email or web address]. By joining [facility]’s FollowMyHealth portal, you will also be able to view your health information using other applications (apps) compatible with our system.

## 2024-06-12 NOTE — ED PROVIDER NOTE - NSFOLLOWUPINSTRUCTIONS_ED_ALL_ED_FT
Acute Abdominal Pain    WHAT YOU NEED TO KNOW:  The cause of your abdominal pain may not be found. If a cause is found, treatment will depend on what the cause is.    DISCHARGE INSTRUCTIONS:    Seek care immediately if:  - You vomit blood or cannot stop vomiting.  - You have blood in your bowel movement or it looks like tar.  - You have bleeding from your rectum.  - Your abdomen is larger than usual, more painful, and hard.  - You have severe pain in your abdomen.  - You stop passing gas and having bowel movements.  - You feel weak, dizzy, or faint.    Contact your healthcare provider if:  - You have a fever.  - You have new signs and symptoms.  - Your symptoms do not get better with treatment.  - You have questions or concerns about your condition or care.    Medicines may be given to decrease pain, treat an infection, and manage your symptoms. Take your medicine as directed. Call your healthcare provider if you think your medicine is not helping or if you have side effects. Tell him if you are allergic to any medicine. Keep a list of the medicines, vitamins, and herbs you take. Include the amounts, and when and why you take them. Bring the list or the pill bottles to follow-up visits. Carry your medicine list with you in case of an emergency.    Manage your symptoms:  - Apply heat on your abdomen for 20 to 30 minutes every 2 hours for as many days as directed. Heat helps decrease pain and muscle spasms.  - Manage your stress. Stress may cause abdominal pain. Your healthcare provider may recommend relaxation techniques and deep breathing exercises to help decrease your stress. Your healthcare provider may recommend you talk to someone about your stress or anxiety, such as a counselor or a trusted friend. Get plenty of sleep and exercise regularly.  - Limit or do not drink alcohol. Alcohol can make your abdominal pain worse. Ask your healthcare provider if it is safe for you to drink alcohol. Also ask how much is safe for you to drink.  - Do not smoke. Nicotine and other chemicals in cigarettes can damage your esophagus and stomach. Ask your healthcare provider for information if you currently smoke and need help to quit. E-cigarettes or smokeless tobacco still contain nicotine. Talk to your healthcare provider before you use these products.  - Make changes to the food you eat as directed: Do not eat foods that cause abdominal pain or other symptoms. Eat small meals more often.  - Eat more high-fiber foods if you are constipated. High-fiber foods include fruits, vegetables, whole-grain foods, and legumes.  - Do not eat foods that cause gas if you have bloating. Examples include broccoli, cabbage, and cauliflower. Do not drink soda or carbonated drinks, because these may also cause gas.  - Do not eat foods or drinks that contain sorbitol or fructose if you have diarrhea and bloating. Some examples are fruit juices, candy, jelly, and sugar-free gum.  - Do not eat high-fat foods, such as fried foods, cheeseburgers, hot dogs, and desserts.  - Limit or do not drink caffeine. Caffeine may make symptoms, such as heart burn or nausea, worse.  - Drink plenty of liquids to prevent dehydration from diarrhea or vomiting. Ask your healthcare provider how much liquid to drink each day and which liquids are best for you.    Follow up with your healthcare provider as directed: Write down your questions so you remember to ask them during your visits.

## 2024-06-12 NOTE — ED PROVIDER NOTE - OBJECTIVE STATEMENT
Phyllis HYMAN: Patient is a 48-year-old female who denies any chronic medical problems, denies any abdominal surgeries here for evaluation of epigastric and left upper abdominal pain for the past 5-6 days.  Patient states that she has been taking Advil without relief.  She denies any fevers or chills.  No vomiting.  She endorses nausea.  No urinary symptoms such as dysuria.  She states she has never had this pain before.  Patient denies any cough, sore throat, chest pain or shortness of breath.

## 2024-06-12 NOTE — ED PROVIDER NOTE - PROGRESS NOTE DETAILS
[place] PGY3  Bloodwork unremarkable and US RUQ did not show acute pathologies. Discussed results with patient and recommended PMD follow up. Return precautions reviewed.

## 2024-06-12 NOTE — ED ADULT NURSE NOTE - NSFALLUNIVINTERV_ED_ALL_ED
Bed/Stretcher in lowest position, wheels locked, appropriate side rails in place/Call bell, personal items and telephone in reach/Instruct patient to call for assistance before getting out of bed/chair/stretcher/Non-slip footwear applied when patient is off stretcher/[place] to call system/Physically safe environment - no spills, clutter or unnecessary equipment/Purposeful proactive rounding/Room/bathroom lighting operational, light cord in reach

## 2024-06-12 NOTE — ED PROVIDER NOTE - CLINICAL SUMMARY MEDICAL DECISION MAKING FREE TEXT BOX
Phyllis HYMAN: Patient is a 48-year-old female who denies any chronic medical problems, denies any abdominal surgeries here for evaluation of epigastric and left upper abdominal pain for the past 5-6 days.  Patient states that she has been taking Advil without relief.  She denies any fevers or chills.  No vomiting.  She endorses nausea.  No urinary symptoms such as dysuria.  She states she has never had this pain before.  Patient denies any cough, sore throat, chest pain or shortness of breath.   On exam, patient has mild epigastric tenderness and left upper quadrant tenderness.  She has no CVA tenderness.  She denies any fevers, vomiting.  She denies burping.  She reports normal bowel movements.  Labs reviewed and there are no acute findings.  Ultrasound results pending.

## 2024-06-12 NOTE — ED PROVIDER NOTE - CHILD ABUSE FACILITY
Problem: Patient Care Overview  Goal: Plan of Care Review  Outcome: Ongoing (interventions implemented as appropriate)  No respiratory distress noted at this time. Will continue to monitor pt.       [facility]

## 2024-06-12 NOTE — ED ADULT NURSE NOTE - OBJECTIVE STATEMENT
49 yo female with no significant PMH presents to the ED ambulatory with steady from home complaining of abdominal pain. Patient reports LUQ pain for 5-6 days worsening with eating with some nausea. Abdomen is soft, tender in LUQ, non-distended. Patient was qdoc'd and IV placed in triage. Upon coming into main ED, patient complaining of IV pain site. No redness or swelling noted in area. Patient states she would like IV removed. RN explained that RN would have to place another PIV for medications. Patient declines any medications at this time. MD Ferguson made aware.

## 2024-06-12 NOTE — ED PROVIDER NOTE - RAPID ASSESSMENT
48-year-old female with no reported significant past medical history, past surgical history uterine ablation 2008 no menses since, no other abdominal surgeries, presents to ED complaining of epigastric/left upper abdominal pain that radiates throughout upper abdomen for the last 5 to 6 days.  Associated with some nausea but no vomiting.  Pain is worse with meals.  No fevers or chills.  No diarrhea.  No dysuria or hematuria.  Patient is in no acute distress in triage.    Patient was seen as a QPA patient. The patient will be seen and further worked up in the main emergency department and their care will be completed by the main emergency department team along with a thorough physical exam. Receiving team will follow up on labs, analgesia, any clinical imaging, reassess and disposition as clinically indicated, all decisions regarding the progression of care will be made at their discretion. - Gab MIR

## 2024-06-13 VITALS
TEMPERATURE: 98 F | RESPIRATION RATE: 20 BRPM | OXYGEN SATURATION: 99 % | DIASTOLIC BLOOD PRESSURE: 73 MMHG | SYSTOLIC BLOOD PRESSURE: 114 MMHG | HEART RATE: 60 BPM

## 2024-06-24 DIAGNOSIS — N63.10 UNSPECIFIED LUMP IN THE RIGHT BREAST, UNSPECIFIED QUADRANT: ICD-10-CM

## 2024-07-05 ENCOUNTER — NON-APPOINTMENT (OUTPATIENT)
Age: 49
End: 2024-07-05

## 2024-07-08 DIAGNOSIS — R92.8 OTHER ABNORMAL AND INCONCLUSIVE FINDINGS ON DIAGNOSTIC IMAGING OF BREAST: ICD-10-CM

## 2024-07-12 DIAGNOSIS — Z98.890 OTHER SPECIFIED POSTPROCEDURAL STATES: ICD-10-CM

## 2024-07-18 ENCOUNTER — NON-APPOINTMENT (OUTPATIENT)
Age: 49
End: 2024-07-18

## 2025-04-25 ENCOUNTER — APPOINTMENT (OUTPATIENT)
Age: 50
End: 2025-04-25
Payer: COMMERCIAL

## 2025-04-25 VITALS
DIASTOLIC BLOOD PRESSURE: 60 MMHG | WEIGHT: 160 LBS | BODY MASS INDEX: 24.25 KG/M2 | SYSTOLIC BLOOD PRESSURE: 100 MMHG | HEIGHT: 68 IN

## 2025-04-25 DIAGNOSIS — Z01.419 ENCOUNTER FOR GYNECOLOGICAL EXAMINATION (GENERAL) (ROUTINE) W/OUT ABNORMAL FINDINGS: ICD-10-CM

## 2025-04-25 DIAGNOSIS — Z12.39 ENCOUNTER FOR OTHER SCREENING FOR MALIGNANT NEOPLASM OF BREAST: ICD-10-CM

## 2025-04-25 DIAGNOSIS — N60.19 DIFFUSE CYSTIC MASTOPATHY OF UNSPECIFIED BREAST: ICD-10-CM

## 2025-04-25 DIAGNOSIS — N95.1 MENOPAUSAL AND FEMALE CLIMACTERIC STATES: ICD-10-CM

## 2025-04-25 DIAGNOSIS — Z80.0 FAMILY HISTORY OF MALIGNANT NEOPLASM OF DIGESTIVE ORGANS: ICD-10-CM

## 2025-04-25 DIAGNOSIS — Z80.3 FAMILY HISTORY OF MALIGNANT NEOPLASM OF BREAST: ICD-10-CM

## 2025-04-25 PROCEDURE — 36415 COLL VENOUS BLD VENIPUNCTURE: CPT

## 2025-04-25 PROCEDURE — 99202 OFFICE O/P NEW SF 15 MIN: CPT | Mod: 25

## 2025-04-25 PROCEDURE — 99386 PREV VISIT NEW AGE 40-64: CPT

## 2025-04-29 LAB
ESTRADIOL SERPL-MCNC: 9 PG/ML
FSH SERPL-MCNC: 69.7 IU/L
HPV HIGH+LOW RISK DNA PNL CVX: NOT DETECTED
LH SERPL-ACNC: 53.3 IU/L

## 2025-05-02 LAB — CYTOLOGY CVX/VAG DOC THIN PREP: NORMAL
